# Patient Record
Sex: FEMALE | Race: WHITE | NOT HISPANIC OR LATINO | Employment: OTHER | URBAN - METROPOLITAN AREA
[De-identification: names, ages, dates, MRNs, and addresses within clinical notes are randomized per-mention and may not be internally consistent; named-entity substitution may affect disease eponyms.]

---

## 2017-03-14 ENCOUNTER — TELEPHONE (OUTPATIENT)
Dept: NEUROLOGY | Facility: HOSPITAL | Age: 67
End: 2017-03-14

## 2017-03-14 NOTE — TELEPHONE ENCOUNTER
----- Message from Marcia Cifuentes sent at 3/13/2017  3:38 PM CDT -----  EAW/NEW- Patient called and would like to know if she can get orders for a colonoscopy. Please call patient back at [phone]. Thanks

## 2017-03-14 NOTE — TELEPHONE ENCOUNTER
Returned call to patient to discuss the colonoscopy orders and if she wants if done at home or here with her next appointment.  LVM to return the call for clarification of how to get the orders to her.

## 2017-03-15 ENCOUNTER — TELEPHONE (OUTPATIENT)
Dept: NEUROLOGY | Facility: HOSPITAL | Age: 67
End: 2017-03-15

## 2017-03-15 NOTE — TELEPHONE ENCOUNTER
Received call back from pt. Pt states that last time she saw Dr. Lam he talked about her getting a c-scope. Her doctor at home told her she only needed a colonoscopy every 5 years. Last c-scope was almost 2 years ago. Pt would like Dr. Canas's opinion. Will forward to Dr. Canas and f/u with pt.

## 2017-03-15 NOTE — TELEPHONE ENCOUNTER
----- Message from Marilyn Hill sent at 3/14/2017  4:06 PM CDT -----  Contact: Patient  EAW- Patient is returning Cindys call about a colonoscopy. Patient can be reached at [phone]. Patient will not be available 4:30pm and 5:15pm Central time today.

## 2017-03-15 NOTE — TELEPHONE ENCOUNTER
Returned pts call regarding colonoscopy. No answer.LVM for pt to return call. Awaiting call back.

## 2017-03-16 ENCOUNTER — TELEPHONE (OUTPATIENT)
Dept: NEUROLOGY | Facility: HOSPITAL | Age: 67
End: 2017-03-16

## 2017-03-16 LAB
EXT 24 HR UR METANEPHRINE: NORMAL
EXT 24 HR UR NORMETANEPHRINE: NORMAL
EXT 24 HR UR NORMETANEPHRINE: NORMAL
EXT 25 HYDROXY VIT D2: NORMAL
EXT 25 HYDROXY VIT D3: NORMAL
EXT 5 HIAA 24 HR URINE: NORMAL
EXT 5 HIAA BLOOD: 21 NG/ML (ref 0–22)
EXT ACTH: NORMAL
EXT AFP: NORMAL
EXT ALBUMIN: 4.3 G/DL (ref 3.6–4.8)
EXT ALKALINE PHOSPHATASE: 91 IU/L (ref 39–117)
EXT ALT: 20 IU/L (ref 0–32)
EXT AMYLASE: NORMAL
EXT ANTI ISLET CELL AB: NORMAL
EXT ANTI PARIETAL CELL AB: NORMAL
EXT ANTI THYROID AB: NORMAL
EXT AST: 24 IU/L (ref 0–40)
EXT BILIRUBIN DIRECT: NORMAL MG/DL
EXT BILIRUBIN TOTAL: 0.3 MG/DL (ref 0–1.2)
EXT BK VIRUS DNA QN PCR: NORMAL
EXT BUN: 12 MG/DL (ref 8–27)
EXT C PEPTIDE: NORMAL
EXT CA 125: NORMAL
EXT CA 19-9: NORMAL
EXT CA 27-29: NORMAL
EXT CALCITONIN: NORMAL
EXT CALCIUM: 9.3 MG/DL (ref 8.7–10.3)
EXT CEA: NORMAL
EXT CHLORIDE: 104 MMOL/L (ref 96–106)
EXT CHOLESTEROL: NORMAL
EXT CHROMOGRANIN A: NORMAL
EXT CO2: 24 MMOL/L (ref 18–29)
EXT CREATININE UA: NORMAL
EXT CREATININE: 0.66 MG/DL (ref 0.57–1)
EXT CYCLOSPORONE LEVEL: NORMAL
EXT DOPAMINE: NORMAL
EXT EBV DNA BY PCR: NORMAL
EXT EPINEPHRINE: NORMAL
EXT FOLATE: NORMAL
EXT FREE T3: NORMAL
EXT FREE T4: NORMAL
EXT FSH: NORMAL
EXT GASTRIN RELEASING PEPTIDE: NORMAL
EXT GASTRIN RELEASING PEPTIDE: NORMAL
EXT GASTRIN: NORMAL
EXT GGT: NORMAL
EXT GHRELIN: NORMAL
EXT GLUCAGON: NORMAL
EXT GLUCOSE: 79 MG/DL (ref 65–99)
EXT GROWTH HORMONE: NORMAL
EXT HCV RNA QUANT PCR: NORMAL
EXT HDL: NORMAL
EXT HEMATOCRIT: 38.3 % (ref 34–46.6)
EXT HEMOGLOBIN A1C: NORMAL
EXT HEMOGLOBIN: 12.9 G/DL (ref 11.1–15.9)
EXT HISTAMINE 24 HR URINE: NORMAL
EXT HISTAMINE: NORMAL
EXT IGF-1: NORMAL
EXT IMMUNKNOW (NON-STIMULATED): NORMAL
EXT IMMUNKNOW (STIMULATED): NORMAL
EXT INR: NORMAL
EXT INSULIN: NORMAL
EXT LANREOTIDE LEVEL: NORMAL
EXT LDH, TOTAL: NORMAL
EXT LDL CHOLESTEROL: NORMAL
EXT LIPASE: NORMAL
EXT MAGNESIUM: NORMAL
EXT METANEPHRINE FREE PLASMA: NORMAL
EXT MOTILIN: NORMAL
EXT NEUROKININ A CAMB: NORMAL
EXT NEUROKININ A ISI: NORMAL
EXT NEUROTENSIN: NORMAL
EXT NOREPINEPHRINE: NORMAL
EXT NORMETANEPHRINE: NORMAL
EXT NSE: NORMAL
EXT OCTREOTIDE LEVEL: NORMAL
EXT PANCREASTATIN CAMB: NORMAL
EXT PANCREASTATIN ISI: 47 PG/ML (ref 10–135)
EXT PANCREATIC POLYPEPTIDE: NORMAL
EXT PHOSPHORUS: NORMAL
EXT PLATELETS: 155 X10E3/UL (ref 150–379)
EXT POTASSIUM: 4.3 MMOL/L (ref 3.5–5.2)
EXT PROGRAF LEVEL: NORMAL
EXT PROLACTIN: NORMAL
EXT PROTEIN TOTAL: 6.4 G/DL (ref 6–8.5)
EXT PROTEIN UA: NORMAL
EXT PT: NORMAL
EXT PTH, INTACT: NORMAL
EXT PTT: NORMAL
EXT RAPAMUNE LEVEL: NORMAL
EXT SEROTONIN: 3 NG/ML (ref 0–420)
EXT SODIUM: 142 MMOL/L (ref 134–144)
EXT SOMATOSTATIN: NORMAL
EXT SUBSTANCE P: NORMAL
EXT TRIGLYCERIDES: NORMAL
EXT TRYPTASE: NORMAL
EXT TSH: NORMAL
EXT URIC ACID: NORMAL
EXT URINE AMYLASE U/HR: NORMAL
EXT URINE AMYLASE U/L: NORMAL
EXT VASOACTIVE INTESTINAL POLYPEPTIDE: NORMAL
EXT VITAMIN B12: NORMAL
EXT VMA 24 HR URINE: NORMAL
EXT WBC: 3.7 X10E3/UL (ref 3.4–10.8)
NEURON SPECIFIC ENOLASE: NORMAL

## 2017-03-16 NOTE — TELEPHONE ENCOUNTER
Called pt after speaking w/ Dr. Canas. Per Dr. Canas, pt should have c-scope 2 years post resection then every 5-10 years. No answer. LVM for pt to return call. Awaiting call back.

## 2017-03-16 NOTE — TELEPHONE ENCOUNTER
Returned pts call and informed that per Dr. Canas, david-scope in 5-10 years. Pt verbalizes understanding.

## 2017-03-16 NOTE — TELEPHONE ENCOUNTER
----- Message from Bee Luna sent at 3/16/2017  1:01 PM CDT -----  EAW- Patient returning Alejandrina's phone call. Please call back to assist.

## 2017-03-23 ENCOUNTER — TELEPHONE (OUTPATIENT)
Dept: NEUROLOGY | Facility: HOSPITAL | Age: 67
End: 2017-03-23

## 2017-03-23 NOTE — TELEPHONE ENCOUNTER
----- Message from Bee Luna sent at 3/21/2017  2:39 PM CDT -----  EAW- Patient would like to know if she would benefit from the GA68 scan. She would like to have it here but if there is no availability she would like to have it locally. Please call back to assist.

## 2017-04-18 ENCOUNTER — HOSPITAL ENCOUNTER (OUTPATIENT)
Dept: CARDIOLOGY | Facility: HOSPITAL | Age: 67
Discharge: HOME | End: 2017-04-18
Attending: SURGERY
Payer: MEDICARE

## 2017-04-18 ENCOUNTER — HOSPITAL ENCOUNTER (OUTPATIENT)
Dept: RADIOLOGY | Facility: HOSPITAL | Age: 67
Discharge: HOME | End: 2017-04-18
Attending: SURGERY
Payer: MEDICARE

## 2017-04-18 DIAGNOSIS — C7A.012 MALIGNANT CARCINOID TUMOR OF ILEUM: ICD-10-CM

## 2017-04-18 DIAGNOSIS — C7B.8 SECONDARY NEUROENDOCRINE TUMOR OF DISTANT LYMPH NODES: ICD-10-CM

## 2017-04-18 LAB
DIASTOLIC DYSFUNCTION: NO
MITRAL VALVE MOBILITY: NORMAL
MITRAL VALVE REGURGITATION: NORMAL
RETIRED EF AND QEF - SEE NOTES: 60 (ref 55–65)
TRICUSPID VALVE REGURGITATION: NORMAL

## 2017-04-18 PROCEDURE — A9585 GADOBUTROL INJECTION: HCPCS | Performed by: SURGERY

## 2017-04-18 PROCEDURE — 74177 CT ABD & PELVIS W/CONTRAST: CPT | Mod: TC

## 2017-04-18 PROCEDURE — 25500020 PHARM REV CODE 255: Performed by: SURGERY

## 2017-04-18 PROCEDURE — 93306 TTE W/DOPPLER COMPLETE: CPT

## 2017-04-18 PROCEDURE — 74183 MRI ABD W/O CNTR FLWD CNTR: CPT | Mod: 26,,, | Performed by: RADIOLOGY

## 2017-04-18 PROCEDURE — 74177 CT ABD & PELVIS W/CONTRAST: CPT | Mod: 26,,, | Performed by: RADIOLOGY

## 2017-04-18 PROCEDURE — 74183 MRI ABD W/O CNTR FLWD CNTR: CPT | Mod: TC

## 2017-04-18 PROCEDURE — 93306 TTE W/DOPPLER COMPLETE: CPT | Mod: 26,,, | Performed by: INTERNAL MEDICINE

## 2017-04-18 RX ORDER — GADOBUTROL 604.72 MG/ML
10 INJECTION INTRAVENOUS
Status: COMPLETED | OUTPATIENT
Start: 2017-04-18 | End: 2017-04-18

## 2017-04-18 RX ADMIN — IOHEXOL 75 ML: 350 INJECTION, SOLUTION INTRAVENOUS at 12:04

## 2017-04-18 RX ADMIN — GADOBUTROL 10 ML: 604.72 INJECTION INTRAVENOUS at 01:04

## 2017-04-18 RX ADMIN — IOHEXOL 30 ML: 350 INJECTION, SOLUTION INTRAVENOUS at 10:04

## 2017-04-19 ENCOUNTER — OFFICE VISIT (OUTPATIENT)
Dept: NEUROLOGY | Facility: HOSPITAL | Age: 67
End: 2017-04-19
Attending: SURGERY
Payer: MEDICARE

## 2017-04-19 VITALS
HEART RATE: 70 BPM | BODY MASS INDEX: 25.3 KG/M2 | DIASTOLIC BLOOD PRESSURE: 77 MMHG | TEMPERATURE: 97 F | WEIGHT: 134 LBS | HEIGHT: 61 IN | SYSTOLIC BLOOD PRESSURE: 117 MMHG

## 2017-04-19 DIAGNOSIS — C7B.8 SECONDARY NEUROENDOCRINE TUMOR OF DISTANT LYMPH NODES: ICD-10-CM

## 2017-04-19 DIAGNOSIS — C7A.012 MALIGNANT CARCINOID TUMOR OF ILEUM: Primary | ICD-10-CM

## 2017-04-19 PROCEDURE — 99215 OFFICE O/P EST HI 40 MIN: CPT | Performed by: SURGERY

## 2017-04-19 RX ORDER — FAMCICLOVIR 500 MG/1
TABLET ORAL
COMMUNITY
Start: 2017-01-10 | End: 2018-01-11

## 2017-04-19 NOTE — MR AVS SNAPSHOT
[facility]  [address] [place] [address]  Phone: [phone]  Fax: [phone]                  Roxy Wilson   2017 11:00 AM   Office Visit    Description:  Female : 1950   Provider:  Juan R Canas MD   Department:  [facility]           Reason for Visit     Follow-up     ileum     Lymph Node Metastasis     Carcinoid Tumor           Diagnoses this Visit        Comments    Malignant carcinoid tumor of ileum    -  Primary     Secondary neuroendocrine tumor of distant lymph nodes                To Do List           Future Appointments        Provider Department Dept Phone    10/25/2017 11:00 AM Juan R Canas MD [facility] [phone]      Goals (5 Years of Data)     None      Follow-Up and Disposition     Return in about 6 months (around 10/19/2017).    Follow-up and Disposition History      [facility]     [facility] -  Assistance  Unless otherwise directed by your provider, please contact [facility], our nurse care line that is available for  assistance.     Registered nurses in the [facility] provide: appointment scheduling, clinical advisement, health education, and other advisory services.  Call: [phone] (toll free)               Medications           Message regarding Medications     Verify the changes and/or additions to your medication regime listed below are the same as discussed with your clinician today.  If any of these changes or additions are incorrect, please notify your healthcare provider.        STOP taking these medications     calcium carbonate (OS-CHELSY) 600 mg (1,500 mg) Tab Take 600 mg by mouth 2 (two) times daily with meals.    green tea leaf extract Cap Take by mouth.    MAGNESIUM ORAL Take 400 mg by mouth once daily.     TURMERIC (CURCUMIN MISC) by Misc.(Non-Drug; Combo Route) route once daily.    valacyclovir (VALTREX) 500 MG tablet  "Take 500 mg by mouth once daily.            Verify that the below list of medications is an accurate representation of the medications you are currently taking.  If none reported, the list may be blank. If incorrect, please contact your healthcare provider. Carry this list with you in case of emergency.           Current Medications     cyanocobalamin (VITAMIN B-12) 1000 MCG tablet Take 100 mcg by mouth once daily.    estradiol (MENOSTAR) 14 mcg/24 hr Place 1 patch onto the skin once a week. 3 weeks on and 1 week off cycle. Apply the patch to a clean, dry, non-oily skin area of your lower abdomen, hips below the waist, or buttocks that has little or no hair and is free of cuts or irritation.    famciclovir (FAMVIR) 500 MG tablet Take one tablet twice daily    fish oil-omega-3 fatty acids 300-1,000 mg capsule Take 2 g by mouth once daily.    levothyroxine (SYNTHROID) 88 MCG tablet Take 88 mcg by mouth once daily.    octreotide (SANDOSTATIN) 200 mcg/mL Soln as needed.     venlafaxine (EFFEXOR) 75 MG tablet Take 75 mg by mouth once daily.     vitamin D 1000 units Tab Take 185 mg by mouth once daily.           Clinical Reference Information           Your Vitals Were     BP Pulse Temp Height Weight BMI    117/77 70 97.2 °F (36.2 °C) (Oral) 5' 1" (1.549 m) 60.8 kg (134 lb) 25.32 kg/m2      Blood Pressure          Most Recent Value    BP  117/77      Allergies as of 4/19/2017     Tetanus And Diphtheria Toxoids, Adsorbed, Adult    Tetanus Vaccines And Toxoid    Luvox [Fluvoxamine]    Serzone [Nefazodone]    Thimerosal    Wellbutrin [Bupropion Hcl]    Pcn [Penicillins]      Immunizations Administered on Date of Encounter - 4/19/2017     None      Orders Placed During Today's Visit     Future Labs/Procedures Expected by Expires    5-HIAA Plasma (Neuroendocrine)  4/19/2017 6/18/2018    CT Abdomen Pelvis With Contrast  4/19/2017 4/19/2018    MRI Abdomen W WO Contrast  4/19/2017 4/19/2018    Neurokinin A  4/19/2017 6/18/2018    " Pancreastatin  4/19/2017 6/18/2018    Serotonin (Neuroendo)  4/19/2017 6/18/2018    CBC auto differential  10/16/2017 6/18/2018    Comprehensive metabolic panel  10/16/2017 6/18/2018      Instructions    Labs every 3 months-orders given to patient    CT, MRI  prior to returning to clinic-call [phone] to schedule in October the day before your appointment in October 2017    Echocardiogram yearly in April    Return to clinic in 6 months-appointment scheduled         Language Assistance Services     ATTENTION: Language assistance services are available, free of charge. Please call [phone].      ATENCIÓN: Si habla español, tiene a ch disposición servicios gratuitos de asistencia lingüística. Llame al [phone].     CHÚ Ý: N?u b?n nói Ti?ng Vi?t, có các d?ch v? h? tr? ngôn ng? mi?n phí dành cho b?n. G?i s? [phone].         [facility] complies with applicable Federal civil rights laws and does not discriminate on the basis of race, color, national origin, age, disability, or sex.

## 2017-04-19 NOTE — PROGRESS NOTES
"NOLANETS:  [facility]  A collaboration between [facility] and [facility]    PATIENT: Roxy Wilson  MRN: [id number]  DATE: 4/19/2017    Vitals:    04/19/17 1111   BP: 117/77   Pulse: 70   Temp: 97.2 °F (36.2 °C)   TempSrc: Oral   Weight: 60.8 kg (134 lb)   Height: 5' 1" (1.549 m)      Last 2 Weight Measurements:   Wt Readings from Last 2 Encounters:   04/19/17 60.8 kg (134 lb)   09/28/16 59.4 kg (131 lb)     BMI: Body mass index is 25.32 kg/(m^2).    Karnofsky Score    Karnofsky Score:  70% - Cares for Self: Unable to Carry on Normal Activity or Active Work        Diagnosis:   1. Malignant carcinoid tumor of ileum    2. Secondary neuroendocrine tumor of distant lymph nodes      Interval History: Ms. Wilson returns to the office in routine follow up of an ileal NET with [facility]    Past Medical History:   Diagnosis Date    Chronic fatigue     Hashimoto's disease     Malignant carcinoid tumor of the ileum 4/2013    Osteoporosis     Secondary neuroendocrine tumor of distant lymph nodes        Past Surgical History:   Procedure Laterality Date    APPENDECTOMY  1/2009    Laproscopic    Colon r/s, SBR, lymph nodes  5/2013    Laproscopic    HYSTERECTOMY  2005    Open       Review of patient's allergies indicates:   Allergen Reactions    Tetanus and diphtheria toxoids, adsorbed, adult Hives, Shortness Of Breath and Swelling    Tetanus vaccines and toxoid Shortness Of Breath    Luvox [fluvoxamine] Hives    Serzone [nefazodone] Hives    Thimerosal     Wellbutrin [bupropion hcl] Hives    Pcn [penicillins] Rash       Current Outpatient Prescriptions   Medication Sig Dispense Refill    cyanocobalamin (VITAMIN B-12) 1000 MCG tablet Take 100 mcg by mouth once daily.      estradiol (MENOSTAR) 14 mcg/24 hr Place 1 patch onto the skin once a week. 3 weeks on and 1 week off cycle. Apply the patch to a clean, dry, non-oily " skin area of your lower abdomen, hips below the waist, or buttocks that has little or no hair and is free of cuts or irritation.      famciclovir (FAMVIR) 500 MG tablet Take one tablet twice daily      fish oil-omega-3 fatty acids 300-1,000 mg capsule Take 2 g by mouth once daily.      levothyroxine (SYNTHROID) 88 MCG tablet Take 88 mcg by mouth once daily.      octreotide (SANDOSTATIN) 200 mcg/mL Soln as needed.       venlafaxine (EFFEXOR) 75 MG tablet Take 75 mg by mouth once daily.       vitamin D 1000 units Tab Take 185 mg by mouth once daily.       No current facility-administered medications for this visit.        Review of Systems     Number of Days per Week Number per Day   DIARRHEA 0    BRISTOL STOOL SCALE RATING     FLUSHING 7 1-2---episodes last about 30 seconds   WHEEZING 0      WEIGHT GAIN/LOSS Stable--134 today   ENERGY RATING (0-10) 10      Physical Exam deferred.     Pathology Data:   no new tissue    Laboratory Data:    Neuroendocrine Labs Latest Ref Rng & Units 3/16/2017 9/28/2016   EXT 5 HIAA BLOOD 0 - 22 ng/ml     EXT GASTRIN pg/ml     EXT SEROTONIN 0 - 420 ng/ml 3    EXT CHROMOGRANIN A 0 - 5 nmol/l     EXT PANCREASTATIN SHANTE 10 - 135 pg/ml 47    EXT NEUROKININ A SHANTE 0 - 40.0 pg/ml       Neuroendocrine Labs Latest Ref Rng & Units 9/26/2016 8/8/2016   EXT 5 HIAA BLOOD 0 - 22 ng/ml  7   EXT GASTRIN pg/ml     EXT SEROTONIN 0 - 420 ng/ml  6   EXT CHROMOGRANIN A 0 - 5 nmol/l     EXT PANCREASTATIN SHANTE 10 - 135 pg/ml  47   EXT NEUROKININ A SHANTE 0 - 40.0 pg/ml  14.0     Neuroendocrine Labs Latest Ref Rng & Units 5/18/2016   EXT 5 HIAA BLOOD 0 - 22 ng/ml    EXT GASTRIN pg/ml    EXT SEROTONIN 0 - 420 ng/ml 8   EXT CHROMOGRANIN A 0 - 5 nmol/l 1   EXT PANCREASTATIN SHANTE 10 - 135 pg/ml 62   EXT NEUROKININ A SHANTE 0 - 40.0 pg/ml <10       Radiology Data:  Comparison: 9/26/16    Findings: Routine abdominal MRI protocol performed with the administration of 10 cc of Gadavist  IV contrast. Two subcentimeter liver  cysts noted, unchanged.  No liver masses identified.  No biliary or pancreatic duct dilatation.  Gallbladder, pancreas, spleen, adrenal glands, and kidneys are unremarkable.  The abdominal aorta tapers normally.  No fluid collections.  Large amount of stool in the visualized portions of the colon.   Impression       No significant changes from the 9/26/16 exam.     Comparison: 9/26/16    Findings: Routine CT abdomen pelvis carcinoid  protocol performed with the administration of 75 cc of Omnipaque 350 IV contrast.     Two subcentimeter liver hypodensities, unchanged.  No concerning liver masses.  The gallbladder, pancreas, spleen, and adrenal glands are unremarkable.  The abdominal aorta tapers normally.  No renal masses or hydronephrosis.  Large amount of stool throughout the colon.  The terminal ileum is unremarkable.  The appendix is not identified.  No dilated loops of bowel.  No lymphadenopathy.  Prior hysterectomy.  Residual ovarian tissue grossly unremarkable.  No fluid collections.  L4-5 and L5-S1 facet arthropathy with mild anterolisthesis.  No marrow replacement process.  Lower lobe pulmonary micronodules, one in each lobe, unchanged.   Impression       No new findings.  Subcentimeter liver hypodensities and pulmonary micronodules, unchanged     CONCLUSIONS     1 - Normal left ventricular systolic function (EF 60-65%).     2 - Normal left ventricular diastolic function.     3 - Normal right ventricular systolic function .     4 - No wall motion abnormalities.        Impression:  1. Stable        Plan:restage in 6 months       Labs: Markers: 3 month intervals            Other: 12 month intervals--every April do echo    Scans: 6 month intervals    Return to Clinic:6 month intervals    Orders placed this visit:   Orders Placed This Encounter   Procedures    CT Abdomen Pelvis With Contrast    MRI Abdomen W WO Contrast    CBC auto differential    Comprehensive metabolic panel    5-HIAA Plasma  (Neuroendocrine)    Serotonin (Neuroendo)    Pancreastatin    Neurokinin A        25 Minutes Face-to-Face; Counseling/Coordination of Care > 50 percent of Visit     Juan R Canas MD, FACS  [employer] [facility] of Surgery, [facility]  [address], [address]  [place]  [address]  Cell [phone]  ph. [phone]; [phone]  fax. [phone]  [email or web address]

## 2017-04-19 NOTE — PATIENT INSTRUCTIONS
Labs every 3 months-orders given to patient    CT, MRI  prior to returning to clinic-call [phone] to schedule in October the day before your appointment in October 2017    Echocardiogram yearly in April    Return to clinic in 6 months-appointment scheduled

## 2017-06-15 LAB
EXT 24 HR UR METANEPHRINE: NORMAL
EXT 24 HR UR NORMETANEPHRINE: NORMAL
EXT 24 HR UR NORMETANEPHRINE: NORMAL
EXT 25 HYDROXY VIT D2: NORMAL
EXT 25 HYDROXY VIT D3: NORMAL
EXT 5 HIAA 24 HR URINE: NORMAL
EXT 5 HIAA BLOOD: NORMAL
EXT ACTH: NORMAL
EXT AFP: NORMAL
EXT ALBUMIN: 4.5 G/DL (ref 3.6–4.8)
EXT ALKALINE PHOSPHATASE: 93 IU/L (ref 39–117)
EXT ALT: 21 IU/L (ref 0–32)
EXT AMYLASE: NORMAL
EXT ANTI ISLET CELL AB: NORMAL
EXT ANTI PARIETAL CELL AB: NORMAL
EXT ANTI THYROID AB: NORMAL
EXT AST: 25 IU/L (ref 0–40)
EXT BILIRUBIN DIRECT: NORMAL
EXT BILIRUBIN TOTAL: 0.4 MG/DL (ref 0–1.2)
EXT BK VIRUS DNA QN PCR: NORMAL
EXT BUN: 10 MG/DL (ref 8–27)
EXT C PEPTIDE: NORMAL
EXT CA 125: NORMAL
EXT CA 19-9: NORMAL
EXT CA 27-29: NORMAL
EXT CALCITONIN: NORMAL
EXT CALCIUM: 9.6 MG/DL (ref 8.7–10.3)
EXT CEA: NORMAL
EXT CHLORIDE: 98 MMOL/L (ref 96–106)
EXT CHOLESTEROL: NORMAL
EXT CHROMOGRANIN A: NORMAL
EXT CO2: 24 MMOL/L (ref 18–29)
EXT CREATININE UA: NORMAL
EXT CREATININE: 0.61 MG/DL (ref 0.57–1)
EXT CYCLOSPORONE LEVEL: NORMAL
EXT DOPAMINE: NORMAL
EXT EBV DNA BY PCR: NORMAL
EXT EPINEPHRINE: NORMAL
EXT FOLATE: NORMAL
EXT FREE T3: NORMAL
EXT FREE T4: NORMAL
EXT FSH: NORMAL
EXT GASTRIN RELEASING PEPTIDE: NORMAL
EXT GASTRIN RELEASING PEPTIDE: NORMAL
EXT GASTRIN: NORMAL
EXT GGT: NORMAL
EXT GHRELIN: NORMAL
EXT GLUCAGON: NORMAL
EXT GLUCOSE: 82 MG/DL (ref 65–99)
EXT GROWTH HORMONE: NORMAL
EXT HCV RNA QUANT PCR: NORMAL
EXT HDL: NORMAL
EXT HEMATOCRIT: 38.3 % (ref 34–46.6)
EXT HEMOGLOBIN A1C: NORMAL
EXT HEMOGLOBIN: 12.8 G/DL (ref 11.1–15.9)
EXT HISTAMINE 24 HR URINE: NORMAL
EXT HISTAMINE: NORMAL
EXT IGF-1: NORMAL
EXT IMMUNKNOW (NON-STIMULATED): NORMAL
EXT IMMUNKNOW (STIMULATED): NORMAL
EXT INR: NORMAL
EXT INSULIN: NORMAL
EXT LANREOTIDE LEVEL: NORMAL
EXT LDH, TOTAL: NORMAL
EXT LDL CHOLESTEROL: NORMAL
EXT LIPASE: NORMAL
EXT MAGNESIUM: NORMAL
EXT METANEPHRINE FREE PLASMA: NORMAL
EXT MOTILIN: NORMAL
EXT NEUROKININ A CAMB: NORMAL
EXT NEUROKININ A ISI: <10 PG/ML (ref 0–40)
EXT NEUROTENSIN: NORMAL
EXT NOREPINEPHRINE: NORMAL
EXT NORMETANEPHRINE: NORMAL
EXT NSE: NORMAL
EXT OCTREOTIDE LEVEL: NORMAL
EXT PANCREASTATIN CAMB: NORMAL
EXT PANCREASTATIN ISI: 56 PG/ML (ref 10–135)
EXT PANCREATIC POLYPEPTIDE: NORMAL
EXT PHOSPHORUS: NORMAL
EXT PLATELETS: 150 X10E3/UL (ref 150–379)
EXT POTASSIUM: 4.6 MMOL/L (ref 3.5–5.2)
EXT PROGRAF LEVEL: NORMAL
EXT PROLACTIN: NORMAL
EXT PROTEIN TOTAL: 7.3 G/DL (ref 6–8.5)
EXT PROTEIN UA: NORMAL
EXT PT: NORMAL
EXT PTH, INTACT: NORMAL
EXT PTT: NORMAL
EXT RAPAMUNE LEVEL: NORMAL
EXT SEROTONIN: 7 NG/ML (ref 0–420)
EXT SODIUM: 139 MMOL/L (ref 134–144)
EXT SOMATOSTATIN: NORMAL
EXT SUBSTANCE P: NORMAL
EXT TRIGLYCERIDES: NORMAL
EXT TRYPTASE: NORMAL
EXT TSH: NORMAL
EXT URIC ACID: NORMAL
EXT URINE AMYLASE U/HR: NORMAL
EXT URINE AMYLASE U/L: NORMAL
EXT VASOACTIVE INTESTINAL POLYPEPTIDE: NORMAL
EXT VITAMIN B12: NORMAL
EXT VMA 24 HR URINE: NORMAL
EXT WBC: 4.2 X10E3/UL (ref 3.4–10.8)
NEURON SPECIFIC ENOLASE: NORMAL

## 2017-09-08 LAB
EXT 24 HR UR METANEPHRINE: NORMAL
EXT 24 HR UR NORMETANEPHRINE: NORMAL
EXT 24 HR UR NORMETANEPHRINE: NORMAL
EXT 25 HYDROXY VIT D2: NORMAL
EXT 25 HYDROXY VIT D3: NORMAL
EXT 5 HIAA 24 HR URINE: NORMAL
EXT 5 HIAA BLOOD: NORMAL
EXT ACTH: NORMAL
EXT AFP: NORMAL
EXT ALBUMIN: 4.1 G/DL (ref 3.6–4.8)
EXT ALKALINE PHOSPHATASE: 94 IU/L (ref 39–117)
EXT ALT: 21 IU/L (ref 0–32)
EXT AMYLASE: NORMAL
EXT ANTI ISLET CELL AB: NORMAL
EXT ANTI PARIETAL CELL AB: NORMAL
EXT ANTI THYROID AB: NORMAL
EXT AST: 20 IU/L (ref 0–40)
EXT BILIRUBIN DIRECT: NORMAL
EXT BILIRUBIN TOTAL: 0.4 MG/DL (ref 0–1.2)
EXT BK VIRUS DNA QN PCR: NORMAL
EXT BUN: 12 MG/DL (ref 8–27)
EXT C PEPTIDE: NORMAL
EXT CA 125: NORMAL
EXT CA 19-9: NORMAL
EXT CA 27-29: NORMAL
EXT CALCITONIN: NORMAL
EXT CALCIUM: 9.5 MG/DL (ref 8.7–10.3)
EXT CEA: NORMAL
EXT CHLORIDE: 102 MMOL/L (ref 96–106)
EXT CHOLESTEROL: NORMAL
EXT CHROMOGRANIN A: NORMAL
EXT CO2: 22 MMOL/L (ref 18–29)
EXT CREATININE UA: NORMAL
EXT CREATININE: 0.63 MG/DL (ref 0.57–1)
EXT CYCLOSPORONE LEVEL: NORMAL
EXT DOPAMINE: NORMAL
EXT EBV DNA BY PCR: NORMAL
EXT EPINEPHRINE: NORMAL
EXT FOLATE: NORMAL
EXT FREE T3: NORMAL
EXT FREE T4: NORMAL
EXT FSH: NORMAL
EXT GASTRIN RELEASING PEPTIDE: NORMAL
EXT GASTRIN RELEASING PEPTIDE: NORMAL
EXT GASTRIN: NORMAL
EXT GGT: NORMAL
EXT GHRELIN: NORMAL
EXT GLUCAGON: NORMAL
EXT GLUCOSE: 79 MG/DL (ref 65–99)
EXT GROWTH HORMONE: NORMAL
EXT HCV RNA QUANT PCR: NORMAL
EXT HDL: NORMAL
EXT HEMATOCRIT: 38 % (ref 34–46.6)
EXT HEMOGLOBIN A1C: NORMAL
EXT HEMOGLOBIN: 13.2 G/DL (ref 11.1–15.9)
EXT HISTAMINE 24 HR URINE: NORMAL
EXT HISTAMINE: NORMAL
EXT IGF-1: NORMAL
EXT IMMUNKNOW (NON-STIMULATED): NORMAL
EXT IMMUNKNOW (STIMULATED): NORMAL
EXT INR: NORMAL
EXT INSULIN: NORMAL
EXT LANREOTIDE LEVEL: NORMAL
EXT LDH, TOTAL: NORMAL
EXT LDL CHOLESTEROL: NORMAL
EXT LIPASE: NORMAL
EXT MAGNESIUM: NORMAL
EXT METANEPHRINE FREE PLASMA: NORMAL
EXT MOTILIN: NORMAL
EXT NEUROKININ A CAMB: NORMAL
EXT NEUROKININ A ISI: <10 PG/ML (ref 0–40)
EXT NEUROTENSIN: NORMAL
EXT NOREPINEPHRINE: NORMAL
EXT NORMETANEPHRINE: NORMAL
EXT NSE: NORMAL
EXT OCTREOTIDE LEVEL: NORMAL
EXT PANCREASTATIN CAMB: NORMAL
EXT PANCREASTATIN ISI: 26 PG/ML (ref 10–135)
EXT PANCREATIC POLYPEPTIDE: NORMAL
EXT PHOSPHORUS: NORMAL
EXT PLATELETS: 157 X10E3/UL (ref 150–379)
EXT POTASSIUM: 4.4 MMOL/L (ref 3.5–5.2)
EXT PROGRAF LEVEL: NORMAL
EXT PROLACTIN: NORMAL
EXT PROTEIN TOTAL: 6.7 G/DL (ref 6–8.5)
EXT PROTEIN UA: NORMAL
EXT PT: NORMAL
EXT PTH, INTACT: NORMAL
EXT PTT: NORMAL
EXT RAPAMUNE LEVEL: NORMAL
EXT SEROTONIN: 20 NG/ML (ref 0–420)
EXT SODIUM: 142 MMOL/L (ref 134–144)
EXT SOMATOSTATIN: NORMAL
EXT SUBSTANCE P: NORMAL
EXT TRIGLYCERIDES: NORMAL
EXT TRYPTASE: NORMAL
EXT TSH: NORMAL
EXT URIC ACID: NORMAL
EXT URINE AMYLASE U/HR: NORMAL
EXT URINE AMYLASE U/L: NORMAL
EXT VASOACTIVE INTESTINAL POLYPEPTIDE: NORMAL
EXT VITAMIN B12: NORMAL
EXT VMA 24 HR URINE: NORMAL
EXT WBC: 3.5 X10E3/UL (ref 3.4–10.8)
NEURON SPECIFIC ENOLASE: NORMAL

## 2018-05-04 ENCOUNTER — TELEPHONE (OUTPATIENT)
Dept: NEUROLOGY | Facility: HOSPITAL | Age: 68
End: 2018-05-04

## 2018-05-04 NOTE — TELEPHONE ENCOUNTER
----- Message from Marilyn Hill sent at 5/4/2018  3:29 PM CDT -----  Contact: Patient  EAW- Patient is requesting an Article on differential diagnosis of flushing to be mailed to her home. Patients call back number is [phone].